# Patient Record
Sex: FEMALE | Race: WHITE | NOT HISPANIC OR LATINO | URBAN - METROPOLITAN AREA
[De-identification: names, ages, dates, MRNs, and addresses within clinical notes are randomized per-mention and may not be internally consistent; named-entity substitution may affect disease eponyms.]

---

## 2020-04-15 ENCOUNTER — PREP FOR SURGERY (OUTPATIENT)
Dept: OTHER | Facility: HOSPITAL | Age: 40
End: 2020-04-15

## 2020-04-15 ENCOUNTER — TELEMEDICINE (OUTPATIENT)
Dept: GASTROENTEROLOGY | Facility: CLINIC | Age: 40
End: 2020-04-15

## 2020-04-15 DIAGNOSIS — R13.19 ESOPHAGEAL DYSPHAGIA: Primary | ICD-10-CM

## 2020-04-15 DIAGNOSIS — R13.10 DYSPHAGIA, UNSPECIFIED TYPE: Primary | ICD-10-CM

## 2020-04-15 DIAGNOSIS — R63.4 WEIGHT LOSS: ICD-10-CM

## 2020-04-15 PROCEDURE — 99244 OFF/OP CNSLTJ NEW/EST MOD 40: CPT | Performed by: INTERNAL MEDICINE

## 2020-04-15 NOTE — PROGRESS NOTES
PCP:  Rey Newman MD     No referring provider defined for this encounter.    Chief Complaint   Patient presents with   • Difficulty Swallowing        HPI   Patient is a 39-year-old female who is having troubles with dysphasia.  It sounds like she started a new medication for anxiety called Klonopin.  This was about 2 weeks ago.  She then several days later began having troubles with a sensation that her tongue was swelling.  She also had some breathing difficulties.  The Klonopin was discontinued.  She was started on BuSpar.  She again began with tingling in the mouth and as well as a dry mouth.  It is at that point that she began having trouble swallowing.  She first had trouble swallowing a [place] ramírez.  The food felt like it got stuck.  She had to throw the food up.  She had no bleeding.  She then had a bite of cake and had a similar issue.  She then had trouble with ice cream.  She has been unable to eat solid foods.  Her appetite is decreased.  She has no reflux disease.  She has had a 7-1/2 pound weight loss.  She is postcholecystectomy for stones, while pregnant.  Her only other surgery was a knee arthroscopy at age 18.  She is not a smoker or heavy drinker.  There is no family history of colon polyps or cancers.    Allergies   Allergen Reactions   • Sulfa Antibiotics Rash     As an infant          Current Outpatient Medications:   •  Citalopram Hydrobromide (CELEXA PO), Take  by mouth., Disp: , Rfl:   •  fluticasone (FLONASE) 50 MCG/ACT nasal spray, 2 sprays into the nostril(s) as directed by provider Daily., Disp: 16 g, Rfl: 0  •  Vitamin D, Cholecalciferol, (CHOLECALCIFEROL) 10 MCG (400 UNIT) tablet, Take 400 Units by mouth Daily., Disp: , Rfl:      Past Medical History:   Diagnosis Date   • Anxiety        Past Surgical History:   Procedure Laterality Date   • KNEE SURGERY          Social History     Socioeconomic History   • Marital status:      Spouse name: Not on file   • Number of  children: Not on file   • Years of education: Not on file   • Highest education level: Not on file   Tobacco Use   • Smoking status: Never Smoker   Substance and Sexual Activity   • Alcohol use: Yes   • Drug use: No   • Sexual activity: Defer        History reviewed. No pertinent family history.     Review of Systems   Constitutional: Positive for appetite change and unexpected weight loss. Negative for chills, diaphoresis and fever.   HENT: Positive for trouble swallowing. Negative for voice change.    Respiratory: Positive for shortness of breath. Negative for cough.    Cardiovascular: Negative.    Gastrointestinal: Negative.    Skin: Negative.    Allergic/Immunologic: Negative.    Neurological: Negative for seizures and facial asymmetry.   Psychiatric/Behavioral: The patient is nervous/anxious.         There were no vitals filed for this visit.     Physical Exam   General Appearance: Alert, in no acute distress   Head: Normocephalic, without obvious abnormality, atraumatic   Eyes: Lids and lashes normal, conjunctivae and sclerae normal, no icterus, no pallor   Ears: Ears appear intact with no abnormalities noted   Extremities: Moves all extremities well,    Skin: No bleeding, bruising or rash   Neurologic: Cranial nerves 2 - 12 grossly intact, no focal deficits     Review of systems was reviewed and positives are noted. All of the remaining review of systems in that system are negative.    Renuka was seen today for difficulty swallowing.    Diagnoses and all orders for this visit:    Esophageal dysphagia    Weight loss    Impressions and plan #1 dysphasia: She looks relatively good visually.  She is able to tolerate liquids.  She cannot eat solids.  At this point given her 7 and half pound weight loss and inability to eat solids, I think would be best to consider an upper endoscopy.  Dr. Newman felt that she could not wait several weeks to have an evaluation for this given her weight loss.  I discussed that  with the patient as well and she would like to move forward.  We will try and get that scheduled in the next 1 to 2 days.  I will look for signs of candidal esophagitis, eosinophilic esophagitis, and less likely stricturing.  I doubt there is any mass but we will look at that time.    Danny Weinstein MD

## 2020-07-17 ENCOUNTER — TRANSCRIBE ORDERS (OUTPATIENT)
Dept: ADMINISTRATIVE | Facility: HOSPITAL | Age: 40
End: 2020-07-17

## 2020-07-17 DIAGNOSIS — R13.10 DYSPHAGIA, IDIOPATHIC: Primary | ICD-10-CM
